# Patient Record
(demographics unavailable — no encounter records)

---

## 2024-12-11 NOTE — DISCUSSION/SUMMARY
[May participate in all age-appropriate activities] : [unfilled] May participate in all age-appropriate activities. [FreeTextEntry1] : - In summary, PK has mild dilation of the ascending aorta seen on the echocardiogram, which should be followed to assess whether is increases out of proportion to his somatic growth. The ascending aorta z-score has been trending lower.   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 Genetic testing was negative.  f/u with neurology was recommended.   - Patent foramen ovale. 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 - history of frequent PVC's seen on his Holter monitor from 5/3/21 which resolved. His Holter from Dec 2021 showed a 4 beat episode of SVT, which was not present on his last Holter monitor.  A Holter monitor was placed to assess the heart rate range and for arrhythmia.  - history of apparent breath holding episodes, have not recurred since 2022. The more recent episodes have been more typical of pallid breath holding spells. In the past, the episodes were unusual because they started in the  period and that he looked leftward each time.  - family history of tuberous sclerosis and absence seizures. there was no evidence of rhabdomyomas on the echocardiogram  - There is no cardiac contraindication to routine dental work/ cleanings.  - I would like to reevaluate him in 1 yr or sooner if there are any further cardiac concerns.   - His mother verbalized understanding, and all questions were answered.  [Needs SBE Prophylaxis] : [unfilled] does not need bacterial endocarditis prophylaxis

## 2024-12-11 NOTE — CONSULT LETTER
[Today's Date] : [unfilled] [Name] : Name: [unfilled] [] : : ~~ [Today's Date:] : [unfilled] [Dear  ___:] : Dear Dr. [unfilled]: [Consult] : I had the pleasure of evaluating your patient, [unfilled]. My full evaluation follows. [Consult - Single Provider] : Thank you very much for allowing me to participate in the care of this patient. If you have any questions, please do not hesitate to contact me. [Sincerely,] : Sincerely, [DrDenis  ___] : Dr. SCHULTZ [FreeTextEntry4] : Amy Hood MD [FreeTextEntry5] : [address] [FreeTextEntry6] : [place] [address] [de-identified] : Nirmala Keller MD, FACC, FASMAURICIO, FAAP\par  Pediatric Cardiologist\par  [facility]\par

## 2024-12-11 NOTE — CONSULT LETTER
[Today's Date] : [unfilled] [Name] : Name: [unfilled] [] : : ~~ [Today's Date:] : [unfilled] [Dear  ___:] : Dear Dr. [unfilled]: [Consult] : I had the pleasure of evaluating your patient, [unfilled]. My full evaluation follows. [Consult - Single Provider] : Thank you very much for allowing me to participate in the care of this patient. If you have any questions, please do not hesitate to contact me. [Sincerely,] : Sincerely, [DrDenis  ___] : Dr. SCHULTZ [FreeTextEntry4] : Amy Hood MD [FreeTextEntry5] : [address] [FreeTextEntry6] : [place] [address] [de-identified] : Nirmala Keller MD, FACC, FASMAURICIO, FAAP\par  Pediatric Cardiologist\par  [facility]\par

## 2024-12-11 NOTE — ADDENDUM
[FreeTextEntry1] : Holter from 9/7/23 Limited study: duration of ~11hrs.  The predominant rhythm was normal sinus rhythm alternating with sinus bradycardia, sinus tachycardia and sinus arrhythmia. HR  , avg 111 bpm No supraventricular ectopy.  No ventricular ectopy.  No symptoms during the study. The study was ended when the patient removed the Holter monitor   This was a normal study for age.

## 2024-12-11 NOTE — CARDIOLOGY SUMMARY
[Today's Date] : [unfilled] [FreeTextEntry1] : Normal sinus rhythm. Atrial and ventricular forces were normal. No ST segment or T-wave abnormality.  QTc 399 [FreeTextEntry2] : 1. Mildly dilated ascending aorta. 2. Ascending aorta dimension (systole) = 1.92 cm (z = 2.13). 3. Ascending aorta diameter in short axis 1.92 cm x 1.83 cm x 1.83 cm. 4. Normal aortic valve morphology and systolic Doppler profile. 5. Patent foramen ovale, with left to right flow across the interatrial septum. 6. Aneurysm of septum primum, normal variant. 7. Physiologic tricuspid valve regurgitation. 8. Physiologic pulmonary valve regurgitation. 9. Normal right ventricular morphology with qualitatively normal size and systolic function. 10. Normal left ventricular size, morphology and systolic function. 11. No pericardial effusion. (5/18/21: AoA 1.22 cm, z score=2.44) (6/17/21: AoA 1.33 cm, z score=2.63) (08/4/21: AoA 1.45 cm, z score=2.59) (12/1/21: AoA 1.57 cm, z score=2.52) (03/9/22:  AoA 1.64 cm, z score=2.54) (09/8/22:  AoA 1.68 cm, z score=2.21) (03/2/23:  AoA 1.76 cm, z score=2.29) (09/7/23:  AoA 1.80 cm, z score=2.21) (12/11/24:  AoA 1.92 cm, z score=2.13) [de-identified] : 9/7/23 [de-identified] : Limited study: duration of ~11hrs.  The predominant rhythm was normal sinus rhythm alternating with sinus bradycardia, sinus tachycardia and sinus arrhythmia. HR  , avg 111 bpm No supraventricular ectopy.  No ventricular ectopy.  No symptoms during the study. The study was ended when the patient removed the Holter monitor   (5/3/21: PVC's avg 350 bph; 5% of all QRS complexes) (6/1/21: No ventricular ectopy) (8/4/21: rare isolated premature atrial complexes, avg 0.1 bph. There were 2 supraventricular couplets (-480 ms). There was no supraventricular tachycardia)  (12/1/21: rare isolated PAC's, avg 0.2 bph. one episode of SVT 4 beats at 167 bpm, when the underlying sinus rate was 74 bpm)   (9/8/22: normal )

## 2024-12-11 NOTE — CARDIOLOGY SUMMARY
[Today's Date] : [unfilled] [FreeTextEntry1] : Normal sinus rhythm. Atrial and ventricular forces were normal. No ST segment or T-wave abnormality.  QTc 399 [FreeTextEntry2] : 1. Mildly dilated ascending aorta. 2. Ascending aorta dimension (systole) = 1.92 cm (z = 2.13). 3. Ascending aorta diameter in short axis 1.92 cm x 1.83 cm x 1.83 cm. 4. Normal aortic valve morphology and systolic Doppler profile. 5. Patent foramen ovale, with left to right flow across the interatrial septum. 6. Aneurysm of septum primum, normal variant. 7. Physiologic tricuspid valve regurgitation. 8. Physiologic pulmonary valve regurgitation. 9. Normal right ventricular morphology with qualitatively normal size and systolic function. 10. Normal left ventricular size, morphology and systolic function. 11. No pericardial effusion. (5/18/21: AoA 1.22 cm, z score=2.44) (6/17/21: AoA 1.33 cm, z score=2.63) (08/4/21: AoA 1.45 cm, z score=2.59) (12/1/21: AoA 1.57 cm, z score=2.52) (03/9/22:  AoA 1.64 cm, z score=2.54) (09/8/22:  AoA 1.68 cm, z score=2.21) (03/2/23:  AoA 1.76 cm, z score=2.29) (09/7/23:  AoA 1.80 cm, z score=2.21) (12/11/24:  AoA 1.92 cm, z score=2.13) [de-identified] : 9/7/23 [de-identified] : Limited study: duration of ~11hrs.  The predominant rhythm was normal sinus rhythm alternating with sinus bradycardia, sinus tachycardia and sinus arrhythmia. HR  , avg 111 bpm No supraventricular ectopy.  No ventricular ectopy.  No symptoms during the study. The study was ended when the patient removed the Holter monitor   (5/3/21: PVC's avg 350 bph; 5% of all QRS complexes) (6/1/21: No ventricular ectopy) (8/4/21: rare isolated premature atrial complexes, avg 0.1 bph. There were 2 supraventricular couplets (-480 ms). There was no supraventricular tachycardia)  (12/1/21: rare isolated PAC's, avg 0.2 bph. one episode of SVT 4 beats at 167 bpm, when the underlying sinus rate was 74 bpm)   (9/8/22: normal )

## 2024-12-11 NOTE — REASON FOR VISIT
[Follow-Up] : a follow-up visit for [Mother] : mother [FreeTextEntry3] : dilation of the ascending aorta

## 2024-12-11 NOTE — HISTORY OF PRESENT ILLNESS
[FreeTextEntry1] : PK is a 3 yr old male who was brought for cardiology follow up due to mild dilation of the ascending aorta.  he was evaluated by the combined connective tissue disorder center at the [facility], on 3/8/23.  A Marfan/TAAD gene panel test, chromosomal microarray and fragile X testing were negative  He has been active at home, eating without difficulty, gaining weight and developing appropriately.  There has been no tachypnea, increased work of breathing, cyanosis or pallor. There has been no chest pain, palpitations or syncope. - Very active, runs, climbs. good exercise tolerance.  - Frequent croup, about 5 times yr. Reactive airway disease with wheezing, treated with albuterol  - seen by neuro, followup recommended - history of hyperflexible fingers - speech delay - but improved. difficulty with fine motor skills-  getting speech therapy and OT - s/p  myringotomy tube placement. Normal hearing.   - He has a history of frequent PVC's seen on his Holter monitor from 5/3/21. His Holter from 21 showed no ventricular ectopy, but there was one episode of supraventricular tachycardia 4 beats at 167 bpm, when the underlying sinus rate was 74 bpm.  His last Holter was a limited study of 11 hrs, and was normal - history of breath holding, resolved .   -Review of history:  - I initially evaluated him when he was 17 days old due to premature ventricular contractions which were diagnosed in the  nursery due to an irregular heart rhythm.  - He had episodes of apparent breath holding, resolved.   Mother's first cousin- 11 yo. tuberous sclerosis, rhabdomyomas  at birth which resolved spontaneously by 5 yrs old, absence seizures Mother - normal cardiac evaluation, by report   father- not in contact. mother recalls that he said he had heart problem. 6''2", short fingers, long toes, large nose. no scoliosis or pectus excavatum, normal intelligence, not hyperextensible to mother's recollection  Pk does not have siblings

## 2024-12-11 NOTE — HISTORY OF PRESENT ILLNESS
[FreeTextEntry1] : PK is a 3 yr old male who was brought for cardiology follow up due to mild dilation of the ascending aorta.  he was evaluated by the combined connective tissue disorder center at the [facility], on 3/8/23.  A Marfan/TAAD gene panel test, chromosomal microarray and fragile X testing were negative  He has been active at home, eating without difficulty, gaining weight and developing appropriately.  There has been no tachypnea, increased work of breathing, cyanosis or pallor. There has been no chest pain, palpitations or syncope. - Very active, runs, climbs. good exercise tolerance.  - Frequent croup, about 5 times yr. Reactive airway disease with wheezing, treated with albuterol  - seen by neuro, followup recommended - history of hyperflexible fingers - speech delay - but improved. difficulty with fine motor skills-  getting speech therapy and OT - s/p  myringotomy tube placement. Normal hearing.   - He has a history of frequent PVC's seen on his Holter monitor from 5/3/21. His Holter from 21 showed no ventricular ectopy, but there was one episode of supraventricular tachycardia 4 beats at 167 bpm, when the underlying sinus rate was 74 bpm.  His last Holter was a limited study of 11 hrs, and was normal - history of breath holding, resolved .   -Review of history:  - I initially evaluated him when he was 17 days old due to premature ventricular contractions which were diagnosed in the  nursery due to an irregular heart rhythm.  - He had episodes of apparent breath holding, resolved.   Mother's first cousin- 11 yo. tuberous sclerosis, rhabdomyomas  at birth which resolved spontaneously by 5 yrs old, absence seizures Mother - normal cardiac evaluation, by report   father- not in contact. mother recalls that he said he had heart problem. 6''2", short fingers, long toes, large nose. no scoliosis or pectus excavatum, normal intelligence, not hyperextensible to mother's recollection  Pk does not have siblings

## 2024-12-11 NOTE — PHYSICAL EXAM
[General Appearance - Alert] : alert [General Appearance - In No Acute Distress] : in no acute distress [General Appearance - Well Nourished] : well nourished [General Appearance - Well-Appearing] : well appearing [Facies] : the head and face were normal in appearance [Appearance Of Head] : the head was normocephalic [Sclera] : the conjunctiva were normal [Outer Ear] : the ears and nose were normal in appearance [Examination Of The Oral Cavity] : mucous membranes were moist and pink [Auscultation Breath Sounds / Voice Sounds] : breath sounds clear to auscultation bilaterally [Normal Chest Appearance] : the chest was normal in appearance [Apical Impulse] : quiet precordium with normal apical impulse [Heart Rate And Rhythm] : normal heart rate and rhythm [Heart Sounds] : normal S1 and S2 [No Murmur] : no murmurs  [Heart Sounds Gallop] : no gallops [Heart Sounds Pericardial Friction Rub] : no pericardial rub [Heart Sounds Click] : no clicks [Arterial Pulses] : normal upper and lower extremity pulses with no pulse delay [Edema] : no edema [Capillary Refill Test] : normal capillary refill [Bowel Sounds] : normal bowel sounds [Abdomen Soft] : soft [Nondistended] : nondistended [Abdomen Tenderness] : non-tender [Nail Clubbing] : no clubbing  or cyanosis of the fingers [] : no rash [Skin Lesions] : no lesions [Skin Turgor] : normal turgor [FreeTextEntry1] : developmental delay